# Patient Record
Sex: FEMALE | Race: WHITE | NOT HISPANIC OR LATINO | Employment: OTHER | URBAN - METROPOLITAN AREA
[De-identification: names, ages, dates, MRNs, and addresses within clinical notes are randomized per-mention and may not be internally consistent; named-entity substitution may affect disease eponyms.]

---

## 2018-06-05 ENCOUNTER — OFFICE VISIT CONVERTED (OUTPATIENT)
Dept: OTOLARYNGOLOGY | Facility: CLINIC | Age: 43
End: 2018-06-05
Attending: OTOLARYNGOLOGY

## 2020-11-03 ENCOUNTER — HOSPITAL ENCOUNTER (OUTPATIENT)
Dept: URGENT CARE | Facility: CLINIC | Age: 45
Discharge: HOME | End: 2020-11-03
Attending: PHYSICIAN ASSISTANT

## 2020-11-05 LAB
BACTERIA SPEC AEROBE CULT: NORMAL
SARS-COV-2 RNA SPEC QL NAA+PROBE: NOT DETECTED

## 2021-03-24 ENCOUNTER — HOSPITAL ENCOUNTER (OUTPATIENT)
Dept: OTHER | Facility: HOSPITAL | Age: 46
Discharge: HOME | End: 2021-03-24
Attending: NEUROLOGICAL SURGERY

## 2021-03-24 LAB
ALBUMIN SERPL-MCNC: 3.9 G/DL (ref 3.5–5)
ALBUMIN/GLOB SERPL: 1.3 {RATIO} (ref 1.4–2.6)
ALP SERPL-CCNC: 79 U/L (ref 42–98)
ALT SERPL-CCNC: 10 U/L (ref 10–40)
ANION GAP SERPL CALC-SCNC: 13 MMOL/L (ref 8–19)
APPEARANCE UR: ABNORMAL
AST SERPL-CCNC: 17 U/L (ref 15–50)
BASOPHILS # BLD AUTO: 0.07 10*3/UL (ref 0–0.2)
BASOPHILS NFR BLD AUTO: 1.1 % (ref 0–3)
BILIRUB SERPL-MCNC: <0.15 MG/DL (ref 0.2–1.3)
BILIRUB UR QL: NEGATIVE
BUN SERPL-MCNC: 12 MG/DL (ref 5–25)
BUN/CREAT SERPL: 12 {RATIO} (ref 6–20)
CALCIUM SERPL-MCNC: 9.3 MG/DL (ref 8.7–10.4)
CHLORIDE SERPL-SCNC: 109 MMOL/L (ref 99–111)
COLOR UR: YELLOW
CONV ABS IMM GRAN: 0.02 10*3/UL (ref 0–0.2)
CONV BACTERIA: ABNORMAL
CONV CO2: 20 MMOL/L (ref 22–32)
CONV COLLECTION SOURCE (UA): ABNORMAL
CONV HYALINE CASTS IN URINE MICRO: ABNORMAL /[LPF]
CONV IMMATURE GRAN: 0.3 % (ref 0–1.8)
CONV TOTAL PROTEIN: 6.8 G/DL (ref 6.3–8.2)
CONV UROBILINOGEN IN URINE BY AUTOMATED TEST STRIP: 1 {EHRLICHU}/DL (ref 0.1–1)
CREAT UR-MCNC: 0.98 MG/DL (ref 0.5–0.9)
DEPRECATED RDW RBC AUTO: 45.3 FL (ref 36.4–46.3)
EOSINOPHIL # BLD AUTO: 0.16 10*3/UL (ref 0–0.7)
EOSINOPHIL # BLD AUTO: 2.5 % (ref 0–7)
ERYTHROCYTE [DISTWIDTH] IN BLOOD BY AUTOMATED COUNT: 13.5 % (ref 11.7–14.4)
GFR SERPLBLD BASED ON 1.73 SQ M-ARVRAT: >60 ML/MIN/{1.73_M2}
GLOBULIN UR ELPH-MCNC: 2.9 G/DL (ref 2–3.5)
GLUCOSE SERPL-MCNC: 104 MG/DL (ref 65–99)
GLUCOSE UR QL: NEGATIVE MG/DL
HCT VFR BLD AUTO: 45.2 % (ref 37–47)
HGB BLD-MCNC: 15 G/DL (ref 12–16)
HGB UR QL STRIP: NEGATIVE
KETONES UR QL STRIP: NEGATIVE MG/DL
LEUKOCYTE ESTERASE UR QL STRIP: ABNORMAL
LYMPHOCYTES # BLD AUTO: 2.5 10*3/UL (ref 1–5)
LYMPHOCYTES NFR BLD AUTO: 39.6 % (ref 20–45)
MCH RBC QN AUTO: 30 PG (ref 27–31)
MCHC RBC AUTO-ENTMCNC: 33.2 G/DL (ref 33–37)
MCV RBC AUTO: 90.4 FL (ref 81–99)
MONOCYTES # BLD AUTO: 0.42 10*3/UL (ref 0.2–1.2)
MONOCYTES NFR BLD AUTO: 6.6 % (ref 3–10)
NEUTROPHILS # BLD AUTO: 3.15 10*3/UL (ref 2–8)
NEUTROPHILS NFR BLD AUTO: 49.9 % (ref 30–85)
NITRITE UR QL STRIP: POSITIVE
NRBC CBCN: 0 % (ref 0–0.7)
OSMOLALITY SERPL CALC.SUM OF ELEC: 286 MOSM/KG (ref 273–304)
PH UR STRIP.AUTO: 6.5 [PH] (ref 5–8)
PLATELET # BLD AUTO: 238 10*3/UL (ref 130–400)
PMV BLD AUTO: 10 FL (ref 9.4–12.3)
POTASSIUM SERPL-SCNC: 4.3 MMOL/L (ref 3.5–5.3)
PROT UR QL: NEGATIVE MG/DL
RBC # BLD AUTO: 5 10*6/UL (ref 4.2–5.4)
RBC #/AREA URNS HPF: ABNORMAL /[HPF]
SODIUM SERPL-SCNC: 138 MMOL/L (ref 135–147)
SP GR UR: 1.02 (ref 1–1.03)
SQUAMOUS SPT QL MICRO: ABNORMAL /[HPF]
WBC # BLD AUTO: 6.32 10*3/UL (ref 4.8–10.8)
WBC #/AREA URNS HPF: ABNORMAL /[HPF]

## 2021-04-01 ENCOUNTER — HOSPITAL ENCOUNTER (OUTPATIENT)
Dept: PREADMISSION TESTING | Facility: HOSPITAL | Age: 46
Discharge: HOME | End: 2021-04-01
Attending: NEUROLOGICAL SURGERY

## 2021-04-01 LAB — SARS-COV-2 RNA SPEC QL NAA+PROBE: NOT DETECTED

## 2021-05-16 VITALS
TEMPERATURE: 99.9 F | RESPIRATION RATE: 15 BRPM | BODY MASS INDEX: 19.65 KG/M2 | HEIGHT: 60 IN | DIASTOLIC BLOOD PRESSURE: 76 MMHG | OXYGEN SATURATION: 99 % | WEIGHT: 100.12 LBS | HEART RATE: 92 BPM | SYSTOLIC BLOOD PRESSURE: 126 MMHG

## 2021-06-10 ENCOUNTER — TRANSCRIBE ORDERS (OUTPATIENT)
Dept: ADMINISTRATIVE | Facility: HOSPITAL | Age: 46
End: 2021-06-10

## 2021-06-10 DIAGNOSIS — G40.309 GENERALIZED EPILEPSY (HCC): Primary | ICD-10-CM

## 2021-06-24 ENCOUNTER — APPOINTMENT (OUTPATIENT)
Dept: NEUROLOGY | Facility: HOSPITAL | Age: 46
End: 2021-06-24

## 2021-07-21 PROBLEM — D49.7 PINEAL TUMOR: Status: ACTIVE | Noted: 2021-07-21

## 2021-07-21 PROBLEM — J32.9 CHRONIC SINUSITIS: Status: ACTIVE | Noted: 2021-07-21

## 2021-07-21 PROBLEM — F41.9 ANXIETY: Status: ACTIVE | Noted: 2021-07-21

## 2021-07-21 PROBLEM — K64.9 HEMORRHOIDS: Status: ACTIVE | Noted: 2021-07-21

## 2021-07-21 PROBLEM — I49.3 PVC (PREMATURE VENTRICULAR CONTRACTION): Status: ACTIVE | Noted: 2021-07-21

## 2021-07-21 PROBLEM — F32.A DEPRESSION: Status: ACTIVE | Noted: 2021-07-21

## 2022-01-10 ENCOUNTER — OFFICE VISIT (OUTPATIENT)
Dept: OBSTETRICS AND GYNECOLOGY | Facility: CLINIC | Age: 47
End: 2022-01-10

## 2022-01-10 VITALS
SYSTOLIC BLOOD PRESSURE: 135 MMHG | HEART RATE: 76 BPM | DIASTOLIC BLOOD PRESSURE: 87 MMHG | BODY MASS INDEX: 20.03 KG/M2 | WEIGHT: 102 LBS | HEIGHT: 60 IN

## 2022-01-10 DIAGNOSIS — N76.4 LEFT GENITAL LABIAL ABSCESS: Primary | ICD-10-CM

## 2022-01-10 DIAGNOSIS — N89.8 VAGINAL DISCHARGE: ICD-10-CM

## 2022-01-10 PROBLEM — J45.909 ASTHMA: Status: ACTIVE | Noted: 2022-01-10

## 2022-01-10 PROCEDURE — 87660 TRICHOMONAS VAGIN DIR PROBE: CPT | Performed by: OBSTETRICS & GYNECOLOGY

## 2022-01-10 PROCEDURE — 87510 GARDNER VAG DNA DIR PROBE: CPT | Performed by: OBSTETRICS & GYNECOLOGY

## 2022-01-10 PROCEDURE — 87480 CANDIDA DNA DIR PROBE: CPT | Performed by: OBSTETRICS & GYNECOLOGY

## 2022-01-10 PROCEDURE — 99213 OFFICE O/P EST LOW 20 MIN: CPT | Performed by: OBSTETRICS & GYNECOLOGY

## 2022-01-10 RX ORDER — KETOROLAC TROMETHAMINE 10 MG/1
TABLET, FILM COATED ORAL EVERY 6 HOURS
COMMUNITY

## 2022-01-10 RX ORDER — METHOCARBAMOL 750 MG/1
TABLET, FILM COATED ORAL AS NEEDED
COMMUNITY
Start: 2021-11-03

## 2022-01-10 RX ORDER — LEVOFLOXACIN 500 MG/1
500 TABLET, FILM COATED ORAL DAILY
Qty: 10 TABLET | Refills: 0 | Status: SHIPPED | OUTPATIENT
Start: 2022-01-10 | End: 2022-01-17

## 2022-01-10 RX ORDER — NAPROXEN 250 MG/1
250 TABLET ORAL 2 TIMES DAILY PRN
COMMUNITY

## 2022-01-10 RX ORDER — LEVETIRACETAM 500 MG/1
TABLET ORAL
COMMUNITY
Start: 2021-11-23

## 2022-01-10 RX ORDER — METRONIDAZOLE 500 MG/1
500 TABLET ORAL 2 TIMES DAILY
Qty: 21 TABLET | Refills: 0 | Status: SHIPPED | OUTPATIENT
Start: 2022-01-10 | End: 2022-01-17

## 2022-01-10 RX ORDER — CLINDAMYCIN HYDROCHLORIDE 300 MG/1
200 CAPSULE ORAL 4 TIMES DAILY
COMMUNITY
End: 2022-01-10 | Stop reason: ALTCHOICE

## 2022-01-10 NOTE — ASSESSMENT & PLAN NOTE
No Bartholin cystic cyst.  The patient confirmed that the area that was drained was the left labia minora.  This sounds consistent with a left labial abscess.  The patient did have culture results from the abscess drainage on her phone which showed E. coli resistant to cefazolin but sensitive to Levaquin.  Clindamycin was not tested.  Discontinue clindamycin  Levaquin 500 mg 1 p.o. daily for a week.  Flagyl 500 mg p.o. twice daily for a week.  Sits baths daily.  Return in 1 week for follow-up.

## 2022-01-10 NOTE — PROGRESS NOTES
"GYN Visit    CC: Follow-up Bartholin cyst    HPI:   46 y.o. the patient was in [place] attending a family emergency last week.  She developed severe pain in the vaginal area and noted a bulge consistent with some sort of cystic structure of the genital region.  She had severe pain so she was seen in the emergency department there.  This was drained and cultured.  She was told this was a Bartholin cyst.  She was placed on clindamycin.  The pain is still present although the bulge seems to be better now.  She denies any fever or chills.  She denies any continued drainage from the incision site.    History: PMHx, Meds, Allergies, PSHx, Social Hx, and POBHx all reviewed and updated.    /87   Pulse 76   Ht 152.4 cm (60\")   Wt 46.3 kg (102 lb)   Breastfeeding No   BMI 19.92 kg/m²     Physical Exam  Vitals and nursing note reviewed. Exam conducted with a chaperone present.   Constitutional:       General: She is not in acute distress.     Appearance: Normal appearance. She is normal weight. She is not ill-appearing.   Neck:      Thyroid: No thyroid mass or thyromegaly.   Cardiovascular:      Rate and Rhythm: Regular rhythm.      Heart sounds: No murmur heard.      Pulmonary:      Effort: Pulmonary effort is normal.      Breath sounds: No wheezing.   Abdominal:      General: There is no distension.      Palpations: Abdomen is soft. There is no mass.      Tenderness: There is no abdominal tenderness.      Hernia: There is no hernia in the left inguinal area or right inguinal area.   Genitourinary:     General: Normal vulva.      Exam position: Lithotomy position.      Pubic Area: No rash.       Labia:         Right: No rash, tenderness, lesion or injury.         Left: Tenderness and lesion present.       Urethra: No prolapse or urethral pain.      Vagina: Vaginal discharge present.      Cervix: Normal.      Uterus: Normal.       Adnexa: Right adnexa normal and left adnexa normal.      Comments: There is " erythema and swelling of the upper portion of the left labia minora near the clitoral simms.  There is no fluctuant or cystic mass.  There is no drainage.  There is copious thick vaginal discharge within the vaginal vault.  There is no evidence of any cystic structures in either Bartholin's area.  Skin:     General: Skin is warm and dry.   Neurological:      Mental Status: She is alert and oriented to person, place, and time.   Psychiatric:         Mood and Affect: Mood normal.         Behavior: Behavior normal.         Thought Content: Thought content normal.           ASSESSMENT AND PLAN:  Diagnoses and all orders for this visit:    1. Left genital labial abscess (Primary)  Assessment & Plan:  No Bartholin cystic cyst.  The patient confirmed that the area that was drained was the left labia minora.  This sounds consistent with a left labial abscess.  The patient did have culture results from the abscess drainage on her phone which showed E. coli resistant to cefazolin but sensitive to Levaquin.  Clindamycin was not tested.  Discontinue clindamycin  Levaquin 500 mg 1 p.o. daily for a week.  Flagyl 500 mg p.o. twice daily for a week.  Sits baths daily.  Return in 1 week for follow-up.    Orders:  -     levoFLOXacin (Levaquin) 500 MG tablet; Take 1 tablet by mouth Daily for 7 days.  Dispense: 10 tablet; Refill: 0  -     metroNIDAZOLE (Flagyl) 500 MG tablet; Take 1 tablet by mouth 2 (Two) Times a Day for 7 days.  Dispense: 21 tablet; Refill: 0    2. Vaginal discharge  -     Gardnerella vaginalis, Trichomonas vaginalis, Candida albicans, DNA - Swab, Cervix      Follow Up:  Return in about 1 week (around 1/17/2022) for Recheck.    Aroldo Adams MD  01/10/2022

## 2022-01-11 ENCOUNTER — TELEPHONE (OUTPATIENT)
Dept: OBSTETRICS AND GYNECOLOGY | Facility: CLINIC | Age: 47
End: 2022-01-11

## 2022-01-11 DIAGNOSIS — B37.31 YEAST VAGINITIS: Primary | ICD-10-CM

## 2022-01-11 LAB
CANDIDA SPECIES: POSITIVE
GARDNERELLA VAGINALIS: POSITIVE
T VAGINALIS DNA VAG QL PROBE+SIG AMP: NEGATIVE

## 2022-01-11 RX ORDER — FLUCONAZOLE 200 MG/1
200 TABLET ORAL
Qty: 3 TABLET | Refills: 0 | Status: SHIPPED | OUTPATIENT
Start: 2022-01-11

## 2022-01-11 NOTE — TELEPHONE ENCOUNTER
----- Message from Aroldo Adams MD sent at 1/11/2022  7:40 AM EST -----  Notify the patient of + yeast and BV. The flagyl given at the OV will treat the BV, and an RX for diflucan was sent to the pharmacy for the yeast.

## 2022-01-11 NOTE — TELEPHONE ENCOUNTER
Discussed results with pt. Adv to finish RX given @ OV. Let her know RX for diflucan has been sent to her pharmacy.

## 2022-01-18 ENCOUNTER — OFFICE VISIT (OUTPATIENT)
Dept: OBSTETRICS AND GYNECOLOGY | Facility: CLINIC | Age: 47
End: 2022-01-18

## 2022-01-18 VITALS
HEART RATE: 87 BPM | DIASTOLIC BLOOD PRESSURE: 80 MMHG | WEIGHT: 99 LBS | SYSTOLIC BLOOD PRESSURE: 117 MMHG | HEIGHT: 60 IN | BODY MASS INDEX: 19.44 KG/M2

## 2022-01-18 DIAGNOSIS — N76.4 LEFT GENITAL LABIAL ABSCESS: Primary | ICD-10-CM

## 2022-01-18 DIAGNOSIS — Z72.0 TOBACCO USE: ICD-10-CM

## 2022-01-18 PROBLEM — N89.8 VAGINAL DISCHARGE: Status: RESOLVED | Noted: 2022-01-10 | Resolved: 2022-01-18

## 2022-01-18 PROCEDURE — 99213 OFFICE O/P EST LOW 20 MIN: CPT | Performed by: OBSTETRICS & GYNECOLOGY

## 2022-01-18 RX ORDER — BUSPIRONE HYDROCHLORIDE 15 MG/1
TABLET ORAL
COMMUNITY
Start: 2022-01-16

## 2022-01-18 NOTE — PROGRESS NOTES
"GYN Visit    CC: Follow up abscess    HPI:   46 y.o. who presents in follow up of a labial abscess.  She reports that she has today and tomorrow left on her antibiotic course.  She reports her symptoms are much improved and essentially resolved.  She has no new complaints today.    History: PMHx, Meds, Allergies, PSHx, Social Hx, and POBHx all reviewed and updated.    /80   Pulse 87   Ht 152.4 cm (60\")   Wt 44.9 kg (99 lb)   BMI 19.33 kg/m²     Physical Exam  Vitals and nursing note reviewed. Exam conducted with a chaperone present.   Constitutional:       General: She is not in acute distress.     Appearance: Normal appearance. She is normal weight. She is not ill-appearing.   Abdominal:      General: Abdomen is flat. Bowel sounds are normal. There is no distension.      Palpations: Abdomen is soft. There is no mass.      Tenderness: There is no abdominal tenderness. There is no guarding or rebound.      Hernia: No hernia is present.   Genitourinary:     General: Normal vulva.      Labia:         Right: No rash, tenderness, lesion or injury.         Left: No rash, tenderness, lesion or injury.       Comments: The left labia minora appears normal.  There is no erythema, there is no drainage, there is no induration.  The incision site from the incision and drainage is well-healed.  Neurological:      Mental Status: She is alert and oriented to person, place, and time.   Psychiatric:         Mood and Affect: Mood normal.         Behavior: Behavior normal.         Thought Content: Thought content normal.           ASSESSMENT AND PLAN:  Diagnoses and all orders for this visit:    1. Left genital labial abscess (Primary)  Assessment & Plan:  Complete Levaquin and Flagyl.  The patient reports that her last dose should be tomorrow.  The abscess and surrounding cellulitis has resolved.  May resume normal activities.  Recommended to avoid shaving in this area.  Discussed returns warnings and signs      2. " Tobacco use  Assessment & Plan:  Smoking cessation counseling          Follow Up:  Return if symptoms worsen or fail to improve.      Aroldo Adams MD  01/18/2022

## 2022-01-18 NOTE — ASSESSMENT & PLAN NOTE
Complete Levaquin and Flagyl.  The patient reports that her last dose should be tomorrow.  The abscess and surrounding cellulitis has resolved.  May resume normal activities.  Recommended to avoid shaving in this area.  Discussed returns warnings and signs

## 2023-06-03 ENCOUNTER — HOSPITAL ENCOUNTER (EMERGENCY)
Facility: HOSPITAL | Age: 48
Discharge: LEFT BEFORE DISCHARGE | End: 2023-06-04
Payer: MEDICARE

## 2023-06-03 VITALS
RESPIRATION RATE: 18 BRPM | HEART RATE: 80 BPM | WEIGHT: 105.6 LBS | DIASTOLIC BLOOD PRESSURE: 88 MMHG | OXYGEN SATURATION: 100 % | SYSTOLIC BLOOD PRESSURE: 158 MMHG | BODY MASS INDEX: 20.73 KG/M2 | HEIGHT: 60 IN

## 2023-06-03 LAB
BASOPHILS # BLD AUTO: 0.09 10*3/MM3 (ref 0–0.2)
BASOPHILS NFR BLD AUTO: 0.9 % (ref 0–1.5)
DEPRECATED RDW RBC AUTO: 47.3 FL (ref 37–54)
EOSINOPHIL # BLD AUTO: 0.15 10*3/MM3 (ref 0–0.4)
EOSINOPHIL NFR BLD AUTO: 1.6 % (ref 0.3–6.2)
ERYTHROCYTE [DISTWIDTH] IN BLOOD BY AUTOMATED COUNT: 14 % (ref 12.3–15.4)
HCT VFR BLD AUTO: 43.2 % (ref 34–46.6)
HGB BLD-MCNC: 14.6 G/DL (ref 12–15.9)
IMM GRANULOCYTES # BLD AUTO: 0.03 10*3/MM3 (ref 0–0.05)
IMM GRANULOCYTES NFR BLD AUTO: 0.3 % (ref 0–0.5)
LYMPHOCYTES # BLD AUTO: 3.68 10*3/MM3 (ref 0.7–3.1)
LYMPHOCYTES NFR BLD AUTO: 38.1 % (ref 19.6–45.3)
MCH RBC QN AUTO: 30.9 PG (ref 26.6–33)
MCHC RBC AUTO-ENTMCNC: 33.8 G/DL (ref 31.5–35.7)
MCV RBC AUTO: 91.3 FL (ref 79–97)
MONOCYTES # BLD AUTO: 0.67 10*3/MM3 (ref 0.1–0.9)
MONOCYTES NFR BLD AUTO: 6.9 % (ref 5–12)
NEUTROPHILS NFR BLD AUTO: 5.03 10*3/MM3 (ref 1.7–7)
NEUTROPHILS NFR BLD AUTO: 52.2 % (ref 42.7–76)
NRBC BLD AUTO-RTO: 0 /100 WBC (ref 0–0.2)
PLATELET # BLD AUTO: 243 10*3/MM3 (ref 140–450)
PMV BLD AUTO: 9.8 FL (ref 6–12)
RBC # BLD AUTO: 4.73 10*6/MM3 (ref 3.77–5.28)
WBC NRBC COR # BLD: 9.65 10*3/MM3 (ref 3.4–10.8)

## 2023-06-03 PROCEDURE — 85025 COMPLETE CBC W/AUTO DIFF WBC: CPT

## 2023-06-03 PROCEDURE — 99211 OFF/OP EST MAY X REQ PHY/QHP: CPT

## 2024-01-31 ENCOUNTER — OFFICE VISIT (OUTPATIENT)
Dept: OTOLARYNGOLOGY | Facility: CLINIC | Age: 49
End: 2024-01-31
Payer: MEDICARE

## 2024-01-31 ENCOUNTER — PROCEDURE VISIT (OUTPATIENT)
Dept: OTOLARYNGOLOGY | Facility: CLINIC | Age: 49
End: 2024-01-31
Payer: MEDICARE

## 2024-01-31 VITALS — SYSTOLIC BLOOD PRESSURE: 121 MMHG | HEART RATE: 75 BPM | DIASTOLIC BLOOD PRESSURE: 78 MMHG | TEMPERATURE: 97.4 F

## 2024-01-31 DIAGNOSIS — H93.12 TINNITUS OF LEFT EAR: Primary | ICD-10-CM

## 2024-01-31 DIAGNOSIS — H90.A32 MIXED CONDUCTIVE AND SENSORINEURAL HEARING LOSS OF LEFT EAR WITH RESTRICTED HEARING OF RIGHT EAR: ICD-10-CM

## 2024-01-31 DIAGNOSIS — H90.A31 MIXED CONDUCTIVE AND SENSORINEURAL HEARING LOSS OF RIGHT EAR WITH RESTRICTED HEARING OF LEFT EAR: ICD-10-CM

## 2024-01-31 DIAGNOSIS — H90.6 MIXED CONDUCTIVE AND SENSORINEURAL HEARING LOSS OF BOTH EARS: ICD-10-CM

## 2024-01-31 DIAGNOSIS — H74.91 DISORDER OF RIGHT MASTOID: ICD-10-CM

## 2024-01-31 DIAGNOSIS — J31.0 CHRONIC RHINITIS: ICD-10-CM

## 2024-01-31 PROCEDURE — 92567 TYMPANOMETRY: CPT | Performed by: AUDIOLOGIST

## 2024-01-31 PROCEDURE — 92557 COMPREHENSIVE HEARING TEST: CPT | Performed by: AUDIOLOGIST

## 2024-01-31 PROCEDURE — 99204 OFFICE O/P NEW MOD 45 MIN: CPT | Performed by: OTOLARYNGOLOGY

## 2024-01-31 RX ORDER — PROMETHAZINE HYDROCHLORIDE 25 MG/1
TABLET ORAL
COMMUNITY
Start: 2023-12-20

## 2024-01-31 RX ORDER — CLONAZEPAM 0.5 MG/1
TABLET ORAL
COMMUNITY
Start: 2023-12-20

## 2024-01-31 RX ORDER — CETIRIZINE HYDROCHLORIDE 10 MG/1
10 TABLET ORAL DAILY PRN
COMMUNITY
End: 2024-02-02 | Stop reason: SDUPTHER

## 2024-01-31 RX ORDER — CETIRIZINE HYDROCHLORIDE 10 MG/1
10 TABLET ORAL DAILY PRN
COMMUNITY
End: 2024-01-31

## 2024-01-31 RX ORDER — CYCLOBENZAPRINE HCL 5 MG
TABLET ORAL
COMMUNITY
Start: 2023-12-20

## 2024-01-31 RX ORDER — PSEUDOEPHEDRINE HCL 30 MG/1
TABLET, FILM COATED ORAL
COMMUNITY
Start: 2024-01-09

## 2024-01-31 NOTE — PROGRESS NOTES
Patient Name: Jovita Ha   Visit Date: 01/31/2024   Patient ID: [id number]  Provider: Eddy Rodriguez MD    Sex: female  Location: [facility]   YOB: 1975  Location Address: [address] [place] [address], [address],?[place]?[id number]    Primary Care Provider eJnny Mendosa MD  Location Phone: [phone]    Referring Provider: Jenny Mendosa MD        Chief Complaint  Tinnitus/Nosebleeds    History of Present Illness  Jovita Ha is a 49 y.o. female who presents to [facility] today as a consult from Jenny Mendosa MD for evaluation of her ears and sinuses.  She was originally seen on 6/5/2018 please see that note for further details.  At that time, she reported 68 episodes of cough, otalgia, and facial pressure annually.  MRI of her brain without contrast as well as a CT of her cervical spine without contrast on 3/15/18.  Both revealed right greater than left maxillary, ethmoid, sphenoid mucosal thickening. She demonstrated evidence of prior turbinate reductions, septoplasty, and bilateral maxillary antrostomies. Her frontal sinuses appeared healthy.  She tells me that since her last appointment she continues to experience intermittent ear infections as well as a sensation of left sided otorrhea.  She also reports clear watery rhinorrhea bilaterally.  She denies any salty or metallic taste in her mouth.  It does not seem to worsen with straining.  She denies any significant issues with nasal congestion or hyposmia.  She has experienced a recent episode of bilateral epistaxis.  She is not using any nasal sprays or rinses.  She has undergone previous allergy testing decades ago.  She also reports right greater than left hearing loss which has been present for years as well as left-sided tinnitus.  She does have a previous history of undergoing a right-sided mastoidectomy in 2001.  She ended up trying hearing aids sometime around 2006.  CT scan of her cervical spine without contrast at [facility] on 2023 demonstrated clear posterior maxillary sinuses, posterior ethmoids, sphenoid sinuses, middle ears, and mastoids.  The anterior maxillary sinuses, anterior ethmoids, and frontal sinuses were not imaged.  She is smoking 1/2 pack daily.        Past Medical History:   Diagnosis Date    Anxiety     Chronic sinusitis     Depression     Pineal tumor     PVC (premature ventricular contraction)        Past Surgical History:   Procedure Laterality Date    CERVICAL CHIARI DECOMPRESSION POSTERIOR       SECTION      COLONOSCOPY  2016    ENDOSCOPY  2016    KIDNEY STONE SURGERY      LUMBAR DECOMPRESSION      NECK SURGERY      SINUS SURGERY  2001    mastoids         Current Outpatient Medications:     ALPRAZolam (Xanax) 0.25 MG tablet, Xanax 0.25 mg oral tablet take 1 tablet by oral route As needed   Suspended, Disp: , Rfl:     atenolol (TENORMIN) 25 MG tablet, atenolol 25 mg oral tablet take 1 tablet (25 mg) by oral route 2 times per day   Active, Disp: , Rfl:     atorvastatin (Lipitor) 10 MG tablet, Lipitor 10 mg oral tablet take 1 tablet (10 mg) by oral route once daily   Active, Disp: , Rfl:     Azelastine HCl 137 MCG/SPRAY solution, , Disp: , Rfl:     busPIRone (BUSPAR) 15 MG tablet, , Disp: , Rfl:     clonazePAM (KlonoPIN) 0.5 MG tablet, , Disp: , Rfl:     cyclobenzaprine (FLEXERIL) 5 MG tablet, , Disp: , Rfl:     estradiol (ESTRACE) 0.5 MG tablet, estradiol 0.5 mg oral tablet take 1 tablet (0.5 mg) by oral route once daily   Active, Disp: , Rfl:     fluconazole (Diflucan) 200 MG tablet, Take 1 tablet by mouth Every 3 (Three) Days., Disp: 3 tablet, Rfl: 0    ketorolac (TORADOL) 10 MG tablet, Every 6 (Six) Hours., Disp: , Rfl:     levETIRAcetam (KEPPRA) 500 MG tablet, , Disp: , Rfl:     methocarbamol (ROBAXIN) 750 MG tablet, As Needed., Disp: , Rfl:     promethazine (PHENERGAN) 25 MG tablet, 1 tab(s) orally q 6 hr prn for 10 day(s), Disp:  , Rfl:     SudoGest 30 MG tablet, , Disp: , Rfl:     topiramate (Topamax) 200 MG tablet, Topamax 200 mg oral tablet take 1 tablet (200 mg) by oral route 2 times per day   Active, Disp: , Rfl:     cetirizine (zyrTEC) 10 MG tablet, Take 1 tablet by mouth Daily As Needed for Allergies., Disp: , Rfl:      No Known Allergies    Social History     Tobacco Use    Smoking status: Every Day     Packs/day: .5     Types: Cigarettes     Start date: 1991    Smokeless tobacco: Never   Vaping Use    Vaping Use: Never used   Substance Use Topics    Alcohol use: Yes     Comment: 2/3 drinks wk    Drug use: Never        Objective     Vital Signs:   /78   Pulse 75   Temp 97.4 °F (36.3 °C)       Physical Exam    General: Well developed, well nourished patient of stated age in no acute distress. Voice is strong and clear.   Head: Normocephalic and atraumatic.  Face: No lesions.  Bilateral parotid and submandibular glands are unremarkable.  Stensen's and Warthin's ducts are productive of clear saliva bilaterally.  House-Brackmann I/VI     bilaterally.   muscles and temporomandibular joint nontender to palpation.  No TMJ crepitus.  Eyes: PERRLA, sclerae anicteric, no conjunctival injection. Extraocular movements are intact and full. No nystagmus.   Ears: Auricles are normal in appearance. Bilateral external auditory canals are unremarkable. Bilateral tympanic membranes are clear and without effusion. Hearing normal to conversational voice.   Nose: External nose is normal in appearance. Bilateral nares are patent with normal appearing mucosa. Septum midline. Turbinates are unremarkable. No lesions.   Oral Cavity: Lips are normal in appearance. Oral mucosa is unremarkable. Gingiva is unremarkable. Normal dentition for age. Tongue is unremarkable with good movement. Hard palate is unremarkable.   Oropharynx: Soft palate is unremarkable with full movement. Uvula is unremarkable. Bilateral tonsils are unremarkable. Posterior  oropharynx is unremarkable.    Larynx and hypopharynx: Deferred secondary to gag reflex.  Neck: Supple.  No mass.  Nontender to palpation.  Trachea midline. Thyroid normal size and without nodules to palpation.   Lymphatic: No lymphadenopathy upon palpation.  Respiratory: Clear to auscultation bilaterally, nonlabored respirations    Cardiovascular: RRR, no murmurs, rubs, or gallops,   Psychiatric: Appropriate affect, cooperative   Neurologic: Oriented x 3, strength symmetric in all extremities, Cranial Nerves II-XII are grossly intact to confrontation   Skin: Warm and dry. No rashes.    Procedures           Result Review :               Assessment and Plan    Diagnoses and all orders for this visit:    1. Tinnitus of left ear (Primary)  -     Audiometry With Tympanometry; Future    2. Mixed conductive and sensorineural hearing loss of both ears    3. Chronic rhinitis      Impressions and findings were discussed at great length.  Examination today is unremarkable.  We reviewed and discussed the images from her CT scan of the cervical spine without contrast on 12/6/2023 which demonstrated clear posterior maxillary sinuses, posterior ethmoids, sphenoid sinuses, middle ears, and mastoids.  The anterior maxillary sinuses, anterior ethmoids, and frontal sinuses were not imaged.  Audiogram on 1/31/2024 demonstrated left mild rising to normal downsloping to moderate mixed loss and right moderate mixed loss which is predominantly conductive.  Her air-bone gap on the right is anywhere from 15 to 40 dB.  SRT is 30 on the right and 20 on the left.  Speech discrimination is 100% on the right at 65 dB and 100% on the left at 60 dB.  Tympanograms are type As bilaterally.  We discussed that there is no evidence of effusion or chronic rhinosinusitis on her recent CT scan but that her sinuses were incompletely imaged.  In regards to her hearing loss we discussed that her right-sided mixed loss which is predominantly conductive and is  likely related to an ossicular issue.  She does have a previous history of right-sided mastoidectomy that her surgery was in 2001 in [facility].  Options for further evaluation and management were discussed including referral for allergy workup as she does not tolerate nasal sprays or rinses versus an oral antihistamine versus further workup with a CT scan of the sinuses.  In regards to her hearing loss we again discussed the differential as well as options for management including binaural amplification versus exploratory right tympanotomy with possible ossicular chain reconstruction.  We reviewed the risks, benefits, alternatives, and expected postoperative course.  She would like to think about her options and will be started on cetirizine.  She was given ample time to ask questions, all of which were answered to her satisfaction.    Follow Up   No follow-ups on file.  Patient was given instructions and counseling regarding her condition or for health maintenance advice. Please see specific information pulled into the AVS if appropriate.

## 2024-01-31 NOTE — PROGRESS NOTES
AUDIOMETRIC EVALUATION      Name:  Jovita Ha  :  1975  Age:  49 y.o.  Date of Evaluation:  2024       History:  Mrs. Ha is seen today for a hearing evaluation due to history of mastoid surgery at the right ear.    Audiologic Information:  Concerns for Hearing: Yes  PETs: No  Other otologic surgical history: Yes mastoid surgery right ear  Aural Pressure/Fullness: Yes  Otalgia: None  Otorrhea: No  Tinnitus: Periodic  Dizziness: No  Noise Exposure: None  Family history of hearing loss: No  Head trauma requiring hospital stay: No  Chemotherapy: No  Other significant history: No    EVALUATION:    See audiogram    RESULTS:    Otoscopic Evaluation:        NOTE: Testing completed after ears were examined by Dr. Rodriguez.    Tympanometry (226 Hz):  Right: Type As  Left: Type As    IMPRESSIONS:  Pure tone thresholds for the right ear shows a moderate mixed hearing loss.  Pure tone thresholds for the left ear shows a mild and moderate mixed hearing loss.  Patient was counseled with regard to the findings.    RECOMMENDATIONS/PLAN:  Follow-up recommendations of Dr. Rodriguez.  Discussed results and recommendations with patient.         Gerard Bahena M.S, [facility]  Licensed Audiologist

## 2024-02-02 ENCOUNTER — TELEPHONE (OUTPATIENT)
Dept: OTOLARYNGOLOGY | Facility: CLINIC | Age: 49
End: 2024-02-02
Payer: MEDICARE

## 2024-02-02 DIAGNOSIS — J31.0 CHRONIC RHINITIS: ICD-10-CM

## 2024-02-02 RX ORDER — CETIRIZINE HYDROCHLORIDE 10 MG/1
10 TABLET ORAL DAILY PRN
Qty: 30 TABLET | Refills: 3 | Status: SHIPPED | OUTPATIENT
Start: 2024-02-02

## 2024-02-02 NOTE — TELEPHONE ENCOUNTER
Provider: DR MUNOZ    Caller: JANUARY BORIS    Relationship to Patient: SELF    Reason for Call: PT CALLED TO CONFIRM SHE WOULD LIKE TO PROCEED WITH SURGERY.    ALSO THE PHARMACY HAS NOT RECVD THE PRESCRIPTION FOR THE ZYTREC. PLEASE SEND THE PRESCRIPTION TO:    [facility]  [address]   # [phone]    PLEASE CALL PT TO CONFIRM PRESCRIPTION HAS BEEN SENT.

## 2024-02-05 ENCOUNTER — PREP FOR SURGERY (OUTPATIENT)
Dept: OTHER | Facility: HOSPITAL | Age: 49
End: 2024-02-05
Payer: MEDICARE

## 2024-02-05 DIAGNOSIS — H90.A31 MIXED CONDUCTIVE AND SENSORINEURAL HEARING LOSS OF RIGHT EAR WITH RESTRICTED HEARING OF LEFT EAR: Primary | ICD-10-CM

## 2024-02-05 RX ORDER — CEFAZOLIN SODIUM 2 G/100ML
2000 INJECTION, SOLUTION INTRAVENOUS ONCE
OUTPATIENT
Start: 2024-02-05 | End: 2024-02-05

## 2024-02-26 PROBLEM — H90.A31 MIXED CONDUCTIVE AND SENSORINEURAL HEARING LOSS OF RIGHT EAR WITH RESTRICTED HEARING OF LEFT EAR: Status: ACTIVE | Noted: 2024-02-05

## 2024-11-27 ENCOUNTER — HOSPITAL ENCOUNTER (EMERGENCY)
Facility: HOSPITAL | Age: 49
Discharge: HOME | End: 2024-11-27
Attending: EMERGENCY MEDICINE | Admitting: EMERGENCY MEDICINE
Payer: MEDICARE

## 2024-11-27 ENCOUNTER — APPOINTMENT (OUTPATIENT)
Dept: CT IMAGING | Facility: HOSPITAL | Age: 49
End: 2024-11-27
Payer: MEDICARE

## 2024-11-27 VITALS
WEIGHT: 117.5 LBS | RESPIRATION RATE: 20 BRPM | TEMPERATURE: 97.9 F | HEIGHT: 60 IN | BODY MASS INDEX: 23.07 KG/M2 | SYSTOLIC BLOOD PRESSURE: 132 MMHG | OXYGEN SATURATION: 97 % | DIASTOLIC BLOOD PRESSURE: 90 MMHG | HEART RATE: 76 BPM

## 2024-11-27 DIAGNOSIS — N13.30 HYDROURETERONEPHROSIS: Primary | ICD-10-CM

## 2024-11-27 DIAGNOSIS — N20.1 URETEROLITHIASIS: ICD-10-CM

## 2024-11-27 LAB
ALBUMIN SERPL-MCNC: 4.3 G/DL (ref 3.5–5.2)
ALBUMIN/GLOB SERPL: 1.3 G/DL
ALP SERPL-CCNC: 82 U/L (ref 39–117)
ALT SERPL W P-5'-P-CCNC: 11 U/L (ref 1–33)
ANION GAP SERPL CALCULATED.3IONS-SCNC: 16.3 MMOL/L (ref 5–15)
AST SERPL-CCNC: 18 U/L (ref 1–32)
BACTERIA UR QL AUTO: ABNORMAL /HPF
BASOPHILS # BLD AUTO: 0.02 10*3/MM3 (ref 0–0.2)
BASOPHILS NFR BLD AUTO: 0.2 % (ref 0–1.5)
BILIRUB SERPL-MCNC: 0.2 MG/DL (ref 0–1.2)
BILIRUB UR QL STRIP: NEGATIVE
BUN SERPL-MCNC: 16 MG/DL (ref 6–20)
BUN/CREAT SERPL: 14 (ref 7–25)
CALCIUM SPEC-SCNC: 9 MG/DL (ref 8.6–10.5)
CHLORIDE SERPL-SCNC: 107 MMOL/L (ref 98–107)
CLARITY UR: ABNORMAL
CO2 SERPL-SCNC: 17.7 MMOL/L (ref 22–29)
COLOR UR: YELLOW
CREAT SERPL-MCNC: 1.14 MG/DL (ref 0.57–1)
DEPRECATED RDW RBC AUTO: 48.3 FL (ref 37–54)
EGFRCR SERPLBLD CKD-EPI 2021: 59.5 ML/MIN/1.73
EOSINOPHIL # BLD AUTO: 0.02 10*3/MM3 (ref 0–0.4)
EOSINOPHIL NFR BLD AUTO: 0.2 % (ref 0.3–6.2)
ERYTHROCYTE [DISTWIDTH] IN BLOOD BY AUTOMATED COUNT: 14.4 % (ref 12.3–15.4)
GLOBULIN UR ELPH-MCNC: 3.2 GM/DL
GLUCOSE SERPL-MCNC: 128 MG/DL (ref 65–99)
GLUCOSE UR STRIP-MCNC: NEGATIVE MG/DL
HCT VFR BLD AUTO: 49.2 % (ref 34–46.6)
HGB BLD-MCNC: 16 G/DL (ref 12–15.9)
HGB UR QL STRIP.AUTO: ABNORMAL
HOLD SPECIMEN: NORMAL
HOLD SPECIMEN: NORMAL
HYALINE CASTS UR QL AUTO: ABNORMAL /LPF
IMM GRANULOCYTES # BLD AUTO: 0.04 10*3/MM3 (ref 0–0.05)
IMM GRANULOCYTES NFR BLD AUTO: 0.4 % (ref 0–0.5)
KETONES UR QL STRIP: NEGATIVE
LEUKOCYTE ESTERASE UR QL STRIP.AUTO: NEGATIVE
LIPASE SERPL-CCNC: 45 U/L (ref 13–60)
LYMPHOCYTES # BLD AUTO: 2.69 10*3/MM3 (ref 0.7–3.1)
LYMPHOCYTES NFR BLD AUTO: 29 % (ref 19.6–45.3)
MCH RBC QN AUTO: 29.6 PG (ref 26.6–33)
MCHC RBC AUTO-ENTMCNC: 32.5 G/DL (ref 31.5–35.7)
MCV RBC AUTO: 91.1 FL (ref 79–97)
MONOCYTES # BLD AUTO: 0.52 10*3/MM3 (ref 0.1–0.9)
MONOCYTES NFR BLD AUTO: 5.6 % (ref 5–12)
NEUTROPHILS NFR BLD AUTO: 5.98 10*3/MM3 (ref 1.7–7)
NEUTROPHILS NFR BLD AUTO: 64.6 % (ref 42.7–76)
NITRITE UR QL STRIP: NEGATIVE
NRBC BLD AUTO-RTO: 0 /100 WBC (ref 0–0.2)
PH UR STRIP.AUTO: 5.5 [PH] (ref 5–8)
PLATELET # BLD AUTO: 295 10*3/MM3 (ref 140–450)
PMV BLD AUTO: 9.8 FL (ref 6–12)
POTASSIUM SERPL-SCNC: 3.2 MMOL/L (ref 3.5–5.2)
PROT SERPL-MCNC: 7.5 G/DL (ref 6–8.5)
PROT UR QL STRIP: ABNORMAL
RBC # BLD AUTO: 5.4 10*6/MM3 (ref 3.77–5.28)
RBC # UR STRIP: ABNORMAL /HPF
REF LAB TEST METHOD: ABNORMAL
SODIUM SERPL-SCNC: 141 MMOL/L (ref 136–145)
SP GR UR STRIP: 1.02 (ref 1–1.03)
SQUAMOUS #/AREA URNS HPF: ABNORMAL /HPF
UROBILINOGEN UR QL STRIP: ABNORMAL
WBC # UR STRIP: ABNORMAL /HPF
WBC NRBC COR # BLD AUTO: 9.27 10*3/MM3 (ref 3.4–10.8)
WHOLE BLOOD HOLD COAG: NORMAL
WHOLE BLOOD HOLD SPECIMEN: NORMAL

## 2024-11-27 PROCEDURE — 85025 COMPLETE CBC W/AUTO DIFF WBC: CPT

## 2024-11-27 PROCEDURE — 36415 COLL VENOUS BLD VENIPUNCTURE: CPT

## 2024-11-27 PROCEDURE — 80053 COMPREHEN METABOLIC PANEL: CPT

## 2024-11-27 PROCEDURE — 99284 EMERGENCY DEPT VISIT MOD MDM: CPT

## 2024-11-27 PROCEDURE — 25810000003 SODIUM CHLORIDE 0.9 % SOLUTION

## 2024-11-27 PROCEDURE — 96375 TX/PRO/DX INJ NEW DRUG ADDON: CPT

## 2024-11-27 PROCEDURE — 25010000002 HYDROMORPHONE PER 4 MG: Performed by: EMERGENCY MEDICINE

## 2024-11-27 PROCEDURE — 96374 THER/PROPH/DIAG INJ IV PUSH: CPT

## 2024-11-27 PROCEDURE — 83690 ASSAY OF LIPASE: CPT

## 2024-11-27 PROCEDURE — 74176 CT ABD & PELVIS W/O CONTRAST: CPT

## 2024-11-27 PROCEDURE — 81001 URINALYSIS AUTO W/SCOPE: CPT

## 2024-11-27 PROCEDURE — 25010000002 ONDANSETRON PER 1 MG

## 2024-11-27 PROCEDURE — 25010000002 KETOROLAC TROMETHAMINE PER 15 MG

## 2024-11-27 RX ORDER — TAMSULOSIN HYDROCHLORIDE 0.4 MG/1
1 CAPSULE ORAL DAILY
Qty: 10 CAPSULE | Refills: 0 | Status: SHIPPED | OUTPATIENT
Start: 2024-11-27

## 2024-11-27 RX ORDER — HYDROMORPHONE HYDROCHLORIDE 1 MG/ML
1 INJECTION, SOLUTION INTRAMUSCULAR; INTRAVENOUS; SUBCUTANEOUS ONCE
Status: COMPLETED | OUTPATIENT
Start: 2024-11-27 | End: 2024-11-27

## 2024-11-27 RX ORDER — KETOROLAC TROMETHAMINE 30 MG/ML
30 INJECTION, SOLUTION INTRAMUSCULAR; INTRAVENOUS ONCE
Status: COMPLETED | OUTPATIENT
Start: 2024-11-27 | End: 2024-11-27

## 2024-11-27 RX ORDER — OXYCODONE AND ACETAMINOPHEN 5; 325 MG/1; MG/1
1 TABLET ORAL EVERY 6 HOURS PRN
Qty: 12 TABLET | Refills: 0 | Status: SHIPPED | OUTPATIENT
Start: 2024-11-27

## 2024-11-27 RX ORDER — ONDANSETRON 2 MG/ML
4 INJECTION INTRAMUSCULAR; INTRAVENOUS ONCE
Status: COMPLETED | OUTPATIENT
Start: 2024-11-27 | End: 2024-11-27

## 2024-11-27 RX ORDER — TAMSULOSIN HYDROCHLORIDE 0.4 MG/1
0.4 CAPSULE ORAL ONCE
Status: COMPLETED | OUTPATIENT
Start: 2024-11-27 | End: 2024-11-27

## 2024-11-27 RX ORDER — ONDANSETRON 4 MG/1
4 TABLET, ORALLY DISINTEGRATING ORAL EVERY 8 HOURS PRN
Qty: 20 TABLET | Refills: 0 | Status: SHIPPED | OUTPATIENT
Start: 2024-11-27

## 2024-11-27 RX ORDER — KETOROLAC TROMETHAMINE 10 MG/1
10 TABLET, FILM COATED ORAL EVERY 6 HOURS PRN
Qty: 15 TABLET | Refills: 0 | Status: SHIPPED | OUTPATIENT
Start: 2024-11-27

## 2024-11-27 RX ORDER — SODIUM CHLORIDE 0.9 % (FLUSH) 0.9 %
10 SYRINGE (ML) INJECTION AS NEEDED
Status: DISCONTINUED | OUTPATIENT
Start: 2024-11-27 | End: 2024-11-27 | Stop reason: HOSPADM

## 2024-11-27 RX ADMIN — HYDROMORPHONE HYDROCHLORIDE 1 MG: 1 INJECTION, SOLUTION INTRAMUSCULAR; INTRAVENOUS; SUBCUTANEOUS at 19:06

## 2024-11-27 RX ADMIN — ONDANSETRON 4 MG: 2 INJECTION INTRAMUSCULAR; INTRAVENOUS at 19:04

## 2024-11-27 RX ADMIN — KETOROLAC TROMETHAMINE 30 MG: 30 INJECTION, SOLUTION INTRAMUSCULAR; INTRAVENOUS at 19:05

## 2024-11-27 RX ADMIN — SODIUM CHLORIDE 1000 ML: 9 INJECTION, SOLUTION INTRAVENOUS at 19:09

## 2024-11-27 RX ADMIN — TAMSULOSIN HYDROCHLORIDE 0.4 MG: 0.4 CAPSULE ORAL at 18:59

## 2024-11-27 NOTE — ED PROVIDER NOTES
Time: 4:53 PM EST  Date of encounter:  2024  Independent Historian/Clinical History and Information was obtained by:   Patient    History is limited by: N/A    Chief Complaint   Patient presents with    Abdominal Pain    Nausea    Vomiting    Fever    Diarrhea         History of Present Illness:  Patient is a 49 y.o. year old female who presents to the emergency department for evaluation of left flank pain that began today.  Patient reports history of similar pain in the past with kidney stones.  Patient also reports nausea and vomiting.  Denies any dysuria or merrill hematuria.  Patient reports fever chills and bodyaches at home but that she is also currently being treated for double ear infection and bronchitis and she believes her fevers are due to this.    Patient also she states having left lower quadrant abdominal pain with diarrhea that started this morning.  Her fever PTA was 101 and took Tylenol earlier today.  She is currently on Augmentin and started this past Monday.    Patient Care Team  Primary Care Provider: Jenny Mendosa MD    Past Medical History:     No Known Allergies  Past Medical History:   Diagnosis Date    Anxiety     Chronic sinusitis     Depression     Pineal tumor     PVC (premature ventricular contraction)      Past Surgical History:   Procedure Laterality Date    CERVICAL CHIARI DECOMPRESSION POSTERIOR       SECTION      COLONOSCOPY  2016    ENDOSCOPY  2016    KIDNEY STONE SURGERY      LUMBAR DECOMPRESSION      NECK SURGERY      SINUS SURGERY  2001    mastoids     Family History   Problem Relation Age of Onset    Stroke Maternal Grandmother     Diabetes Maternal Grandmother        Home Medications:  Prior to Admission medications    Medication Sig Start Date End Date Taking? Authorizing Provider   ALPRAZolam (Xanax) 0.25 MG tablet Xanax 0.25 mg oral tablet take 1 tablet by oral route As needed   Suspended    Provider, MD Safia   atenolol (TENORMIN) 25 MG tablet  atenolol 25 mg oral tablet take 1 tablet (25 mg) by oral route 2 times per day   Active    Safia Kaufman MD   atorvastatin (Lipitor) 10 MG tablet Lipitor 10 mg oral tablet take 1 tablet (10 mg) by oral route once daily   Active    Safia Kaufman MD   Azelastine HCl 137 MCG/SPRAY solution  2/14/22   Safia Kaufman MD   busPIRone (BUSPAR) 15 MG tablet  1/16/22   Safia Kaufman MD   cetirizine (zyrTEC) 10 MG tablet Take 1 tablet by mouth Daily As Needed for Allergies. 2/2/24   Eddy Rodriguez MD   clonazePAM (KlonoPIN) 0.5 MG tablet  12/20/23   Safia Kaufman MD   cyclobenzaprine (FLEXERIL) 5 MG tablet  12/20/23   Safia Kaufman MD   estradiol (ESTRACE) 0.5 MG tablet estradiol 0.5 mg oral tablet take 1 tablet (0.5 mg) by oral route once daily   Active    Safia Kaufman MD   fluconazole (Diflucan) 200 MG tablet Take 1 tablet by mouth Every 3 (Three) Days. 1/11/22   Aroldo Adams MD   ketorolac (TORADOL) 10 MG tablet Every 6 (Six) Hours.    Safia Kaufman MD   levETIRAcetam (KEPPRA) 500 MG tablet  11/23/21   Safia Kaufman MD   methocarbamol (ROBAXIN) 750 MG tablet As Needed. 11/3/21   Safia Kaufman MD   promethazine (PHENERGAN) 25 MG tablet 1 tab(s) orally q 6 hr prn for 10 day(s) 12/20/23   Safia Kaufman MD   SudoGest 30 MG tablet  1/9/24   Safia Kaufman MD   topiramate (Topamax) 200 MG tablet Topamax 200 mg oral tablet take 1 tablet (200 mg) by oral route 2 times per day   Active    ProviderSafia MD        Social History:   Social History     Tobacco Use    Smoking status: Every Day     Current packs/day: 0.50     Average packs/day: 0.5 packs/day for 33.9 years (17.0 ttl pk-yrs)     Types: Cigarettes     Start date: 1991    Smokeless tobacco: Never   Vaping Use    Vaping status: Never Used   Substance Use Topics    Alcohol use: Yes     Comment: 2/3 drinks wk    Drug use: Never         Review of  "Systems:  Review of Systems   Constitutional: Negative.  Positive for fever.   HENT: Negative.     Eyes: Negative.    Respiratory: Negative.     Cardiovascular: Negative.    Gastrointestinal: Negative.  Positive for abdominal pain, diarrhea, nausea and vomiting.   Endocrine: Negative.    Genitourinary: Negative.  Positive for flank pain. Negative for dysuria and hematuria.   Musculoskeletal: Negative.    Skin: Negative.    Allergic/Immunologic: Negative.    Neurological: Negative.    Hematological: Negative.    Psychiatric/Behavioral: Negative.          Physical Exam:  /98 (BP Location: Right arm, Patient Position: Sitting)   Pulse 68   Temp 98.1 °F (36.7 °C) (Oral)   Resp 16   Ht 152.4 cm (60\")   Wt 53.3 kg (117 lb 8.1 oz)   SpO2 97%   BMI 22.95 kg/m²         Physical Exam  Vitals and nursing note reviewed.   Constitutional:       Appearance: Normal appearance.   HENT:      Head: Normocephalic and atraumatic.      Nose: Nose normal.      Mouth/Throat:      Mouth: Mucous membranes are moist.   Eyes:      Extraocular Movements: Extraocular movements intact.      Conjunctiva/sclera: Conjunctivae normal.      Pupils: Pupils are equal, round, and reactive to light.   Cardiovascular:      Rate and Rhythm: Normal rate and regular rhythm.      Heart sounds: Normal heart sounds.   Pulmonary:      Effort: Pulmonary effort is normal.      Breath sounds: Normal breath sounds.   Abdominal:      General: Abdomen is flat. Bowel sounds are normal. There is no distension.      Palpations: Abdomen is soft.      Tenderness: There is abdominal tenderness in the left lower quadrant. There is left CVA tenderness. There is no right CVA tenderness.   Musculoskeletal:         General: Normal range of motion.      Cervical back: Normal range of motion and neck supple.   Skin:     General: Skin is warm and dry.   Neurological:      General: No focal deficit present.      Mental Status: She is alert and oriented to person, place, " and time.   Psychiatric:         Mood and Affect: Mood normal.         Behavior: Behavior normal.                  Procedures:  Procedures      Medical Decision Making:      Comorbidities that affect care:    Kidney stones    External Notes reviewed:    Previous Clinic Note: Office visit with PCP 11/25/2024 where she was seen for acute sinusitis and sore throat with nasal discharge x 1 week.      The following orders were placed and all results were independently analyzed by me:  Orders Placed This Encounter   Procedures    CT Abdomen Pelvis Stone Protocol    [place] Draw    Comprehensive Metabolic Panel    Lipase    Urinalysis With Microscopic If Indicated (No Culture) - Urine, Clean Catch    CBC Auto Differential    Urinalysis, Microscopic Only - Urine, Clean Catch    NPO Diet NPO Type: Strict NPO    Undress & Gown    Inpatient Urology Consult    Insert Peripheral IV    CBC & Differential    Green Top (Gel)    Lavender Top    Gold Top - SST    Light Blue Top       Medications Given in the Emergency Department:  Medications   sodium chloride 0.9 % flush 10 mL (has no administration in time range)   ondansetron (ZOFRAN) injection 4 mg (4 mg Intravenous Given 11/27/24 1904)   ketorolac (TORADOL) injection 30 mg (30 mg Intravenous Given 11/27/24 1905)   sodium chloride 0.9 % bolus 1,000 mL (1,000 mL Intravenous New Bag 11/27/24 1909)   HYDROmorphone PF (DILAUDID) injection 1 mg (1 mg Intravenous Given 11/27/24 1906)   tamsulosin (FLOMAX) 24 hr capsule 0.4 mg (0.4 mg Oral Given 11/27/24 1859)        ED Course:    The patient was initially evaluated in the triage area where orders were placed. The patient was later dispositioned by Radha Andrade PA-C.      The patient was advised to stay for completion of workup which includes but is not limited to communication of labs and radiological results, reassessment and plan. The patient was advised that leaving prior to disposition by a provider could result in critical  findings that are not communicated to the patient.     ED Course as of 11/27/24 1951 Wed Nov 27, 2024 1655   --- PROVIDER IN TRIAGE NOTE ---    The patient was evaluated by Kacey delarosa in triage. Orders were placed and the patient is currently awaiting disposition.      [CE]   1945 Patient's pain is controlled.  She has no white count and is afebrile.  No concern for urosepsis. [AJ]      ED Course User Index  [AJ] Radha Andrade, CHIN  [CE] Kacey Santiago P, APRWILY       Labs:    Lab Results (last 24 hours)       Procedure Component Value Units Date/Time    CBC & Differential [[id number]]  (Abnormal) Collected: 11/27/24 1632    Specimen: Blood from Arm, Right Updated: 11/27/24 1655    Narrative:      The following orders were created for panel order CBC & Differential.  Procedure                               Abnormality         Status                     ---------                               -----------         ------                     CBC Auto Differential[[id number]]        Abnormal            Final result                 Please view results for these tests on the individual orders.    Comprehensive Metabolic Panel [[id number]]  (Abnormal) Collected: 11/27/24 1632    Specimen: Blood from Arm, Right Updated: 11/27/24 1718     Glucose 128 mg/dL      BUN 16 mg/dL      Creatinine 1.14 mg/dL      Sodium 141 mmol/L      Potassium 3.2 mmol/L      Chloride 107 mmol/L      CO2 17.7 mmol/L      Calcium 9.0 mg/dL      Total Protein 7.5 g/dL      Albumin 4.3 g/dL      ALT (SGPT) 11 U/L      AST (SGOT) 18 U/L      Alkaline Phosphatase 82 U/L      Total Bilirubin 0.2 mg/dL      Globulin 3.2 gm/dL      A/G Ratio 1.3 g/dL      BUN/Creatinine Ratio 14.0     Anion Gap 16.3 mmol/L      eGFR 59.5 mL/min/1.73     Narrative:      GFR Normal >60  Chronic Kidney Disease <60  Kidney Failure <15      Lipase [[id number]]  (Normal) Collected: 11/27/24 1632    Specimen: Blood from Arm, Right Updated: 11/27/24 1718     Lipase 45  U/L     CBC Auto Differential [[id number]]  (Abnormal) Collected: 11/27/24 1632    Specimen: Blood from Arm, Right Updated: 11/27/24 1655     WBC 9.27 10*3/mm3      RBC 5.40 10*6/mm3      Hemoglobin 16.0 g/dL      Hematocrit 49.2 %      MCV 91.1 fL      MCH 29.6 pg      MCHC 32.5 g/dL      RDW 14.4 %      RDW-SD 48.3 fl      MPV 9.8 fL      Platelets 295 10*3/mm3      Neutrophil % 64.6 %      Lymphocyte % 29.0 %      Monocyte % 5.6 %      Eosinophil % 0.2 %      Basophil % 0.2 %      Immature Grans % 0.4 %      Neutrophils, Absolute 5.98 10*3/mm3      Lymphocytes, Absolute 2.69 10*3/mm3      Monocytes, Absolute 0.52 10*3/mm3      Eosinophils, Absolute 0.02 10*3/mm3      Basophils, Absolute 0.02 10*3/mm3      Immature Grans, Absolute 0.04 10*3/mm3      nRBC 0.0 /100 WBC     Urinalysis With Microscopic If Indicated (No Culture) - Urine, Clean Catch [[id number]]  (Abnormal) Collected: 11/27/24 1639    Specimen: Urine, Clean Catch Updated: 11/27/24 1659     Color, UA Yellow     Appearance, UA Cloudy     pH, UA 5.5     Specific Gravity, UA 1.016     Glucose, UA Negative     Ketones, UA Negative     Bilirubin, UA Negative     Blood, UA Large (3+)     Protein, UA Trace     Leuk Esterase, UA Negative     Nitrite, UA Negative     Urobilinogen, UA 0.2 E.U./dL    Urinalysis, Microscopic Only - Urine, Clean Catch [[id number]]  (Abnormal) Collected: 11/27/24 1639    Specimen: Urine, Clean Catch Updated: 11/27/24 1659     RBC, UA Too Numerous to Count /HPF      WBC, UA 3-5 /HPF      Bacteria, UA None Seen /HPF      Squamous Epithelial Cells, UA 7-12 /HPF      Hyaline Casts, UA 3-6 /LPF      Methodology Automated Microscopy             Imaging:    CT Abdomen Pelvis Stone Protocol    Result Date: 11/27/2024  CT ABDOMEN PELVIS STONE PROTOCOL Date of Exam: 11/27/2024 5:33 PM EST Indication: left flank pain. Comparison: None available. Technique: Axial CT images were obtained of the abdomen and pelvis without the administration of  contrast. Reconstructed coronal and sagittal images were also obtained. Automated exposure control and iterative construction methods were used. FINDINGS: Lung bases: No masses. No consolidation. Liver:No masses. No intrahepatic biliary ductal dilatation. Spleen:No masses. No perisplenic hematoma. Pancreas:No pancreatic masses. No evidence of pancreatitis. Gallbladder and common bile duct:No evidence of cholelithiasis. No evidence of cholecystitis. Adrenal glands:No adrenal masses Kidneys and ureters: Severe left-sided hydroureteronephrosis. Nonobstructing bilateral renal calculi measuring up to 0.5 cm on the left and 0.4 cm on the right. Left-sided hydroureter extends through a 0.6 cm distal left ureteric calculus on image #156 series 2 as well as a 0.9 cm calculus at the left ureterovesicular junction. Urinary bladder:No urinary bladder wall thickening. No bladder masses. Small bowel:Normal caliber small bowel. Large bowel:No diverticulosis or diverticulitis. No large bowel masses are appreciated Appendix: Normal GENITOURINARY: Prior hysterectomy Ascites or pneumoperitoneum:None. Adenopathy:None present Osseous structures: The proximal femurs are intact. The pubic bones are intact. The sacrum and sacroiliac joints are normal. Prior lumbar spine fusion. Previous Other findings: None     Severe left-sided hydroureteronephrosis with 2 obstructing left renal calculi measuring 0.6 cm and 0.9 cm respectively Electronically Signed: Eleuterio Yousif MD  11/27/2024 6:00 PM EST  Workstation ID: [id number]       Differential Diagnosis and Discussion:      Abdominal Pain: Based on the patient's signs and symptoms, I considered abdominal aortic aneurysm, small bowel obstruction, pancreatitis, acute cholecystitis, acute appendecitis, peptic ulcer disease, gastritis, colitis, endocrine disorders, irritable bowel syndrome and other differential diagnosis an etiology of the patient's abdominal pain.  Flank Pain: Differential diagnosis  includes but is not limited to kidney stones, pyelonephritis, musculoskeletal disorders, renal infarction, urinary tract infection, hydronephrosis, radiculopathy, aortic aneurysm, renal cell carcinoma.    All labs were reviewed and interpreted by me.  CT scan radiology impression was interpreted by me.    MDM                   Patient Care Considerations:    SEPSIS was considered but is NOT present in the emergency department as SIRS criteria is not present. ANTIBIOTICS: I considered prescribing antibiotics as an outpatient however no bacterial focus of infection was found.      Consultants/Shared Management Plan:    Consultant: I have discussed the case with Dr. Thomas, urologist who states patient can follow-up in office    Social Determinants of Health:    Patient is independent, reliable, and has access to care.       Disposition and Care Coordination:    Discharged: I considered escalation of care by admitting this patient to the hospital, however after consulting with urology, patient to follow-up in office    I have explained the patient´s condition, diagnoses and treatment plan based on the information available to me at this time. I have answered questions and addressed any concerns. The patient has a good  understanding of the patient´s diagnosis, condition, and treatment plan as can be expected at this point. The vital signs have been stable. The patient´s condition is stable and appropriate for discharge from the emergency department.      The patient will pursue further outpatient evaluation with the primary care physician or other designated or consulting physician as outlined in the discharge instructions. They are agreeable to this plan of care and follow-up instructions have been explained in detail. The patient has received these instructions in written format and has expressed an understanding of the discharge instructions. The patient is aware that any significant change in condition or worsening  of symptoms should prompt an immediate return to this or the closest emergency department or call to 911.  I have explained discharge medications and the need for follow up with the patient/caretakers. This was also printed in the discharge instructions. Patient was discharged with the following medications and follow up:      Medication List        New Prescriptions      ondansetron ODT 4 MG disintegrating tablet  Commonly known as: ZOFRAN-ODT  Place 1 tablet on the tongue Every 8 (Eight) Hours As Needed for Nausea or Vomiting.     tamsulosin 0.4 MG capsule 24 hr capsule  Commonly known as: FLOMAX  Take 1 capsule by mouth Daily.            Changed      * ketorolac 10 MG tablet  Commonly known as: TORADOL  What changed: Another medication with the same name was added. Make sure you understand how and when to take each.     * ketorolac 10 MG tablet  Commonly known as: TORADOL  Take 1 tablet by mouth Every 6 (Six) Hours As Needed for Moderate Pain.  What changed: You were already taking a medication with the same name, and this prescription was added. Make sure you understand how and when to take each.           * This list has 2 medication(s) that are the same as other medications prescribed for you. Read the directions carefully, and ask your doctor or other care provider to review them with you.                   Where to Get Your Medications        These medications were sent to [facility]/pharmacy #[id number] - [place], [place] - [address] - [phone] [facility] [phone]   [address] [place] [address]      Hours: 24-hours Phone: [phone]   ketorolac 10 MG tablet  ondansetron ODT 4 MG disintegrating tablet  tamsulosin 0.4 MG capsule 24 hr capsule      No follow-up provider specified.     Final diagnoses:   Hydroureteronephrosis   Ureterolithiasis        ED Disposition       ED Disposition   Discharge    Condition   Stable    Comment   --               This medical record created using voice  recognition software.             Radha Andrade PA-C  11/27/24 1951

## 2024-11-28 NOTE — DISCHARGE INSTRUCTIONS
Your urine was negative for UTI  Your lab work shows no concern for infection  Your CT scan shows that you have 2 kidney stones in the left distal ureter  I have sent pain medicine, nausea medicine along with Flomax.  Please take Flomax daily until he passed a kidney stone.  Please use urine strainer every time you urinate until they pass      I have consulted with urology who states she can follow-up.  I have put in referral and their office will call you to schedule a follow-up appointment    If any fevers, uncontrolled pain or vomiting please return to the ED

## 2024-12-13 ENCOUNTER — HOSPITAL ENCOUNTER (OUTPATIENT)
Dept: GENERAL RADIOLOGY | Facility: HOSPITAL | Age: 49
Discharge: HOME | End: 2024-12-13
Payer: MEDICARE

## 2024-12-13 ENCOUNTER — TRANSCRIBE ORDERS (OUTPATIENT)
Dept: GENERAL RADIOLOGY | Facility: HOSPITAL | Age: 49
End: 2024-12-13
Payer: MEDICARE

## 2024-12-13 DIAGNOSIS — N20.0 NEPHROLITHIASIS: Primary | ICD-10-CM

## 2024-12-13 DIAGNOSIS — N20.0 NEPHROLITHIASIS: ICD-10-CM

## 2024-12-13 PROCEDURE — 74018 RADEX ABDOMEN 1 VIEW: CPT

## 2025-01-20 ENCOUNTER — TELEPHONE (OUTPATIENT)
Dept: UROLOGY | Age: 50
End: 2025-01-20
Payer: MEDICARE

## 2025-01-20 NOTE — TELEPHONE ENCOUNTER
Sp w/ pt to triage and get on schedule; pt states she did pass both of the stones that were on previous CT and does not currently have stones or any signs or symptoms; RN states will notify scheduling to get her on the next available to go over everything w/ Dr. Morrison; pt is agreeable and understanding; RN advised since she has a history if feels like is getting another stone or needs to get in sooner to notify us; pt states understanding and is agreeable; no further questions at this time.

## 2025-02-24 NOTE — PROGRESS NOTES
Chief Complaint: new patient and recurrent nephrolithiasis (Stones spaced several years apart and pt. Says she has passed the ones she currently had in November)    Subjective         History of Present Illness  Jovita Ha is a 50 y.o. female presents to [facility] to be seen for recurrent nephrolithiasis.    The patient was seen at [facility] ED on 11/27/2024 with complaints of left flank pain.  CT scan showed 2 stones in the left ureter with severe left-sided hydroureteronephrosis.  The patient reports that she was able to pass both of those kidney stones shortly after her visit to the ED.  Follow-up abdominal x-ray completed on 12/13/2024 did not show the previously noted distal ureteral stones.  The patient denies any bothersome urinary symptoms.  She denies any gross hematuria.  She reports that she does have a longstanding history of recurrent kidney stones going back to 2003.  She reports that she has had 4 or 5 surgeries for removal of stones, with her last procedure being done in 2007.  We did discuss that the patient's CT scan that was done at the ER did show nonobstructing bilateral renal calculi measuring up to 5 mm on the left and 4 mm on the right.  The patient's PCP did discuss with her that her use of Topamax could be contributing to her recurrent kidney stones.  The patient reports that this medication is very important to her because it does help significantly with her headaches and does not want to come off of this medication if she does not have to.    Frequency-denies     Urgency-denies     Incontinence-denies     Nocturia-denies     GH-denies     History of stones-admits     [id number] stone interventions from [id number]     Family history of  malignancy-denies     Cardiopulmonary-PVCs/PACs, asthma Chiari malformation, chronic migraines     Anticoagulants-denies     Smoker-admits, less than 1/2 ppd     CT ABDOMEN PELVIS STONE PROTOCOL  Date of  Exam: 11/27/2024 5:33 PM EST     Indication: left flank pain.     Comparison: None available.     FINDINGS:     Lung bases: No masses. No consolidation.     Liver:No masses. No intrahepatic biliary ductal dilatation.     Spleen:No masses. No perisplenic hematoma.     Pancreas:No pancreatic masses. No evidence of pancreatitis.     Gallbladder and common bile duct:No evidence of cholelithiasis. No evidence of cholecystitis.     Adrenal glands:No adrenal masses     Kidneys and ureters: Severe left-sided hydroureteronephrosis. Nonobstructing bilateral renal calculi measuring up to 0.5 cm on the left and 0.4 cm on the right. Left-sided hydroureter extends through a 0.6 cm distal left ureteric calculus on image #[id number]   series 2 as well as a 0.9 cm calculus at the left ureterovesicular junction.      Urinary bladder:No urinary bladder wall thickening. No bladder masses.     Small bowel:Normal caliber small bowel.     Large bowel:No diverticulosis or diverticulitis. No large bowel masses are appreciated     Appendix: Normal     GENITOURINARY: Prior hysterectomy     Ascites or pneumoperitoneum:None.     Adenopathy:None present     Osseous structures: The proximal femurs are intact. The pubic bones are intact. The sacrum and sacroiliac joints are normal. Prior lumbar spine fusion. Previous     Other findings: None     IMPRESSION:  Severe left-sided hydroureteronephrosis with 2 obstructing left renal calculi measuring 0.6 cm and 0.9 cm respectively     Electronically Signed: Eleuterio Yousif MD    11/27/2024 6:00 PM EST     XR ABDOMEN 1 VW     Date of Exam: 12/13/2024 1:26 PM EST     Indication: Nephrolithiasis     Comparison: CT abdomen pelvis November 27, 2024     Findings:  There is a scattered amount of gas and residue within bowel in a nonspecific fashion. Patient has had previous spinal surgery at L5-S1. Previously noted distal ureteral stones not identified on this exam. The small intrarenal calculi noted on CT not well    appreciated on this exam.     IMPRESSION:  Impression:  1.Nonspecific bowel gas pattern.  2.Previously noted distal ureteral stones not identified on this exam.           Electronically Signed: Damian Manuel MD    2024 1:32 PM EST     Objective     Past Medical History:   Diagnosis Date    Anxiety     Chronic sinusitis     Depression     Pineal tumor     PVC (premature ventricular contraction)        Past Surgical History:   Procedure Laterality Date    CERVICAL CHIARI DECOMPRESSION POSTERIOR       SECTION      COLONOSCOPY  2016    ENDOSCOPY  2016    KIDNEY STONE SURGERY      LUMBAR DECOMPRESSION      NECK SURGERY      SINUS SURGERY  2001    mastoids         Current Outpatient Medications:     albuterol (PROVENTIL) (2.5 MG/3ML) 0.083% nebulizer solution, Every 8 (Eight) Hours., Disp: , Rfl:     atenolol (TENORMIN) 25 MG tablet, atenolol 25 mg oral tablet take 1 tablet (25 mg) by oral route 2 times per day   Active, Disp: , Rfl:     atorvastatin (Lipitor) 10 MG tablet, Lipitor 10 mg oral tablet take 1 tablet (10 mg) by oral route once daily   Active, Disp: , Rfl:     busPIRone (BUSPAR) 15 MG tablet, , Disp: , Rfl:     estradiol (ESTRACE) 0.5 MG tablet, estradiol 0.5 mg oral tablet take 1 tablet (0.5 mg) by oral route once daily   Active, Disp: , Rfl:     ketorolac (TORADOL) 10 MG tablet, Every 6 (Six) Hours., Disp: , Rfl:     ketorolac (TORADOL) 10 MG tablet, Take 1 tablet by mouth Every 6 (Six) Hours As Needed for Moderate Pain., Disp: 15 tablet, Rfl: 0    methocarbamol (ROBAXIN) 750 MG tablet, As Needed., Disp: , Rfl:     promethazine (PHENERGAN) 25 MG tablet, 1 tab(s) orally q 6 hr prn for 10 day(s), Disp: , Rfl:     topiramate (Topamax) 200 MG tablet, Topamax 200 mg oral tablet take 1 tablet (200 mg) by oral route 2 times per day   Active, Disp: , Rfl:     Ventolin  (90 Base) MCG/ACT inhaler, Every 8 (Eight) Hours., Disp: , Rfl:     ALPRAZolam (Xanax) 0.25 MG tablet, Xanax 0.25 mg oral tablet  "take 1 tablet by oral route As needed   Suspended (Patient not taking: Reported on 2/27/2025), Disp: , Rfl:     Azelastine HCl 137 MCG/SPRAY solution, , Disp: , Rfl:     cetirizine (zyrTEC) 10 MG tablet, Take 1 tablet by mouth Daily As Needed for Allergies. (Patient not taking: Reported on 2/27/2025), Disp: 30 tablet, Rfl: 3    clonazePAM (KlonoPIN) 0.5 MG tablet, , Disp: , Rfl:     cyclobenzaprine (FLEXERIL) 5 MG tablet, , Disp: , Rfl:     levETIRAcetam (KEPPRA) 500 MG tablet, , Disp: , Rfl:     ondansetron ODT (ZOFRAN-ODT) 4 MG disintegrating tablet, Place 1 tablet on the tongue Every 8 (Eight) Hours As Needed for Nausea or Vomiting. (Patient not taking: Reported on 2/27/2025), Disp: 20 tablet, Rfl: 0    oxyCODONE-acetaminophen (PERCOCET) 5-325 MG per tablet, Take 1 tablet by mouth Every 6 (Six) Hours As Needed for Severe Pain. (Patient not taking: Reported on 2/27/2025), Disp: 12 tablet, Rfl: 0    SudoGest 30 MG tablet, , Disp: , Rfl:     tamsulosin (FLOMAX) 0.4 MG capsule 24 hr capsule, Take 1 capsule by mouth Daily. (Patient not taking: Reported on 2/27/2025), Disp: 10 capsule, Rfl: 0    No Known Allergies     Family History   Problem Relation Age of Onset    Stroke Maternal Grandmother     Diabetes Maternal Grandmother        Social History     Socioeconomic History    Marital status:     Number of children: 3   Tobacco Use    Smoking status: Every Day     Current packs/day: 0.50     Average packs/day: 0.5 packs/day for 34.2 years (17.1 ttl pk-yrs)     Types: Cigarettes     Start date: 1991    Smokeless tobacco: Never   Vaping Use    Vaping status: Never Used   Substance and Sexual Activity    Alcohol use: Yes     Comment: 2/3 drinks wk    Drug use: Never    Sexual activity: Not Currently       Vital Signs:   Resp 12   Ht 152.4 cm (60\")   Wt 53.1 kg (117 lb)   BMI 22.85 kg/m²      Physical Exam  Vitals and nursing note reviewed.   Constitutional:       General: She is not in acute distress.     " Appearance: Normal appearance. She is not toxic-appearing.   Pulmonary:      Effort: Pulmonary effort is normal. No respiratory distress.   Neurological:      General: No focal deficit present.      Mental Status: She is alert and oriented to person, place, and time.          Result Review :   The following data was reviewed by: TERRENCE Patel on 02/27/2025:  Results for orders placed or performed in visit on 02/27/25   Bladder Scan    Collection Time: 02/27/25 10:06 AM   Result Value Ref Range    Urine Volume 0      Bladder Scan interpretation 02/27/2025    Estimation of residual urine via BVI 3000 Verathon Bladder Scan  MA/nurse performing: Justyna SIMS MA  Residual Urine: 0 ml  Indication: Recurrent nephrolithiasis    Ureterolithiasis, history of    Hydroureteronephrosis, history of   Position: Supine  Examination: Incremental scanning of the suprapubic area using 2.0 MHz transducer using copious amounts of acoustic gel.   Findings: An anechoic area was demonstrated which represented the bladder, with measurement of residual urine as noted. I inspected this myself. In that the residual urine was stable or insignificant, refer to plan for treatment and plan necessary at this time.              Procedures        Assessment and Plan    Diagnoses and all orders for this visit:    1. Recurrent nephrolithiasis (Primary)  -     Bladder Scan  -     XR Abdomen KUB; Future    2. Ureterolithiasis, history of  -     US Renal Bilateral; Future    3. Hydroureteronephrosis, history of    Will obtain renal ultrasound to ensure that the hydronephrosis has resolved.  Discussed prevention of kidney stones, handouts provided.      Will plan to see the patient back in the office in 1 year with KUB prior or sooner if needed for any concerns, passage of kidney stones, etc.      Follow Up   Return in about 1 year (around 2/27/2026) for with KUB prior .  Patient was given instructions and counseling regarding her condition or for  health maintenance advice. Please see specific information pulled into the AVS if appropriate.         This document has been electronically signed by TERRENCE Patel  February 27, 2025 12:36 EST

## 2025-02-27 ENCOUNTER — OFFICE VISIT (OUTPATIENT)
Dept: UROLOGY | Age: 50
End: 2025-02-27
Payer: MEDICARE

## 2025-02-27 VITALS — BODY MASS INDEX: 22.97 KG/M2 | RESPIRATION RATE: 12 BRPM | HEIGHT: 60 IN | WEIGHT: 117 LBS

## 2025-02-27 DIAGNOSIS — N20.0 RECURRENT NEPHROLITHIASIS: Primary | ICD-10-CM

## 2025-02-27 DIAGNOSIS — N13.30 HYDROURETERONEPHROSIS: ICD-10-CM

## 2025-02-27 DIAGNOSIS — N20.1 URETEROLITHIASIS: ICD-10-CM

## 2025-02-27 LAB — URINE VOLUME: 0

## 2025-02-27 RX ORDER — ALBUTEROL SULFATE 90 UG/1
AEROSOL, METERED RESPIRATORY (INHALATION) EVERY 8 HOURS SCHEDULED
COMMUNITY

## 2025-02-27 RX ORDER — ALBUTEROL SULFATE 0.83 MG/ML
SOLUTION RESPIRATORY (INHALATION) EVERY 8 HOURS SCHEDULED
COMMUNITY

## 2025-02-28 ENCOUNTER — OFFICE VISIT (OUTPATIENT)
Dept: CARDIOLOGY | Facility: CLINIC | Age: 50
End: 2025-02-28
Payer: MEDICARE

## 2025-02-28 VITALS
SYSTOLIC BLOOD PRESSURE: 122 MMHG | WEIGHT: 118 LBS | DIASTOLIC BLOOD PRESSURE: 84 MMHG | HEIGHT: 60 IN | BODY MASS INDEX: 23.16 KG/M2 | HEART RATE: 65 BPM

## 2025-02-28 DIAGNOSIS — I25.10 ATHEROSCLEROSIS OF NATIVE CORONARY ARTERY OF NATIVE HEART WITHOUT ANGINA PECTORIS: ICD-10-CM

## 2025-02-28 DIAGNOSIS — I10 HYPERTENSION, ESSENTIAL: ICD-10-CM

## 2025-02-28 DIAGNOSIS — I49.3 PVC (PREMATURE VENTRICULAR CONTRACTION): ICD-10-CM

## 2025-02-28 DIAGNOSIS — R07.9 CHEST PAIN, UNSPECIFIED TYPE: Primary | ICD-10-CM

## 2025-02-28 DIAGNOSIS — E78.2 HYPERLIPEMIA, MIXED: ICD-10-CM

## 2025-02-28 PROCEDURE — 1159F MED LIST DOCD IN RCRD: CPT | Performed by: SPECIALIST

## 2025-02-28 PROCEDURE — 93000 ELECTROCARDIOGRAM COMPLETE: CPT | Performed by: SPECIALIST

## 2025-02-28 PROCEDURE — 99204 OFFICE O/P NEW MOD 45 MIN: CPT | Performed by: SPECIALIST

## 2025-02-28 PROCEDURE — 1160F RVW MEDS BY RX/DR IN RCRD: CPT | Performed by: SPECIALIST

## 2025-02-28 NOTE — PROGRESS NOTES
[facility]   Cardiology Consult Note    Patient Name: Jovita Ha  : 1975  Referring Physician: Jenny Mendosa MD  Subjective   Subjective     Reason for Consult/ Chief Complaint:   Chest pain/palpitation  HPI:  Jovita Ha is a 50 y.o. female with history of chest pain on and off for several months.  Chest pain is substernal lasted a few seconds relieved spontaneously no exertional angina.  She also has some occasional palpitations for several years.  Non-smoker.    Review of Systems:    Constitutional no fever,  no weight loss   Skin no rash   Otolaryngeal no difficulty swallowing   Cardiovascular See HPI   Pulmonary no cough, no sputum production   Gastrointestinal no constipation, no diarrhea   Genitourinary no dysuria, no hematuria   Hematologic no easy bruisability, no abnormal bleeding   Musculoskeletal no muscle pain   Neurologic no dizziness, no falls       Personal History     Past Medical History:  Past Medical History:   Diagnosis Date    Anxiety     Chronic sinusitis     Depression     Pineal tumor     PVC (premature ventricular contraction)        Family History:   Family History   Problem Relation Age of Onset    Stroke Maternal Grandmother     Diabetes Maternal Grandmother        Social History:  reports that she has been smoking cigarettes. She started smoking about 34 years ago. She has a 17.1 pack-year smoking history. She has never used smokeless tobacco. She reports current alcohol use. She reports that she does not use drugs.    Home Medications:  albuterol, albuterol sulfate HFA, atenolol, atorvastatin, busPIRone, estradiol, ketorolac, methocarbamol, promethazine, and topiramate    Allergies:  No Known Allergies    Objective    Objective     Vitals:   Heart Rate:  [65] 65  BP: (122)/(84) 122/84  Body mass index is 23.05 kg/m².  PHYSICAL EXAM:    General Appearance:   well developed  well nourished  HENT:   oropharynx moist  lips not cyanotic  Neck:  thyroid not  enlarged  supple  Respiratory:  no respiratory distress  normal breath sounds  no rales  Cardiovascular:  no jugular venous distention  regular rhythm  apical impulse normal  S1 normal, S2 normal  no S3, no S4   no murmur  no rub, no thrill  carotid pulses normal; no bruit  pedal pulses normal  lower extremity edema: none    Skin:   warm, dry  Psychiatric:  judgement and insight appropriate  normal mood and affect    RESULTS:    EKG reviewed by me and shows sinus rhythm       Result Review    Result Review:  I have personally reviewed the available results:  [x]  Laboratory  [x]  EKG/Telemetry   [x]  Cardiology/Vascular   [x] Medications  [x]  Old records      ECG 12 Lead    Date/Time: 2/28/2025 12:26 PM  Performed by: Rogelio Spencer MD    Authorized by: Rogelio Spencer MD  Comparison: compared with previous ECG   Similar to previous ECG  Rhythm: sinus rhythm  Rate: normal  QRS axis: normal  Comments: Normal sinus rhythm.  No significant changes compared to previous EKG           Impression/Plan  1. Precordial atypical chest pain: Sestamibi stress test to evaluate for any ischemia in view of her risk factors.  2.  Palpitations: Continue Tenormin 25 mg once a day.  24-hour Holter monitoring.  Echocardiogram.  3.  Mixed hyperlipidemia: Continue Lipitor 10 mg once a day.  Monitor lipid and hepatic profile.  4.  Positive nicotine use: Smoking cessation discussed with patient      Electronically signed by Rogelio Spencer MD, 02/28/25, 11:41 AM EST.

## 2025-03-05 ENCOUNTER — HOSPITAL ENCOUNTER (OUTPATIENT)
Facility: HOSPITAL | Age: 50
Discharge: HOME | End: 2025-03-05
Admitting: SPECIALIST
Payer: MEDICARE

## 2025-03-05 DIAGNOSIS — I49.3 PVC (PREMATURE VENTRICULAR CONTRACTION): ICD-10-CM

## 2025-03-05 PROCEDURE — 93225 XTRNL ECG REC<48 HRS REC: CPT

## 2025-03-06 ENCOUNTER — HOSPITAL ENCOUNTER (OUTPATIENT)
Dept: ULTRASOUND IMAGING | Facility: HOSPITAL | Age: 50
Discharge: HOME | End: 2025-03-06
Admitting: NURSE PRACTITIONER
Payer: MEDICARE

## 2025-03-06 DIAGNOSIS — N20.1 URETEROLITHIASIS: ICD-10-CM

## 2025-03-06 PROCEDURE — 76775 US EXAM ABDO BACK WALL LIM: CPT

## 2025-03-07 ENCOUNTER — TELEPHONE (OUTPATIENT)
Dept: UROLOGY | Age: 50
End: 2025-03-07
Payer: MEDICARE

## 2025-03-07 NOTE — PROGRESS NOTES
Please let the patient know that her ultrasound does show improvement of the hydronephrosis that she had previously which is what we would want to see after passage of a stone.  We will plan to see the patient back in the office as scheduled or sooner if needed.  Thank you.

## 2025-03-07 NOTE — TELEPHONE ENCOUNTER
----- Message from Hayley Garcia sent at 3/7/2025  7:41 AM EST -----  Please let the patient know that her ultrasound does show improvement of the hydronephrosis that she had previously which is what we would want to see after passage of a stone.  We will plan to see the patient back in the office as scheduled or soone  r if needed.  Thank you.

## 2025-03-07 NOTE — TELEPHONE ENCOUNTER
Patient was called and advised that ultrasound shows improvement.  Patient voiced understanding and will keep follow up as scheduled.

## 2025-03-10 LAB
CV ZIO BASELINE AVG BPM: 62
CV ZIO BASELINE BPM HIGH: 110
CV ZIO BASELINE BPM LOW: 44

## 2025-03-11 ENCOUNTER — HOSPITAL ENCOUNTER (OUTPATIENT)
Facility: HOSPITAL | Age: 50
Discharge: HOME | End: 2025-03-11
Admitting: SPECIALIST
Payer: MEDICARE

## 2025-03-11 DIAGNOSIS — I49.3 PVC (PREMATURE VENTRICULAR CONTRACTION): ICD-10-CM

## 2025-03-11 LAB
ASCENDING AORTA: 2.6 CM
AV MEAN PRESS GRAD SYS DOP V1V2: 3 MMHG
AV VMAX SYS DOP: 125 CM/SEC
BH CV ECHO MEAS - AO MAX PG: 6.3 MMHG
BH CV ECHO MEAS - AO ROOT DIAM: 2.3 CM
BH CV ECHO MEAS - AO V2 VTI: 25.4 CM
BH CV ECHO MEAS - AVA(I,D): 1.73 CM2
BH CV ECHO MEAS - IVS/LVPW: 1.6 CM
BH CV ECHO MEAS - IVSD: 0.8 CM
BH CV ECHO MEAS - LA DIMENSION: 2 CM
BH CV ECHO MEAS - LAT PEAK E' VEL: 10.8 CM/SEC
BH CV ECHO MEAS - LV MAX PG: 6.8 MMHG
BH CV ECHO MEAS - LV MEAN PG: 3 MMHG
BH CV ECHO MEAS - LV V1 MAX: 130 CM/SEC
BH CV ECHO MEAS - LV V1 VTI: 21.9 CM
BH CV ECHO MEAS - LVIDD: 3.6 CM
BH CV ECHO MEAS - LVIDS: 2.3 CM
BH CV ECHO MEAS - LVOT AREA: 2.01 CM2
BH CV ECHO MEAS - LVOT DIAM: 1.6 CM
BH CV ECHO MEAS - LVPWD: 0.5 CM
BH CV ECHO MEAS - MED PEAK E' VEL: 11.8 CM/SEC
BH CV ECHO MEAS - MV A MAX VEL: 24.1 CM/SEC
BH CV ECHO MEAS - MV E MAX VEL: 70.7 CM/SEC
BH CV ECHO MEAS - MV E/A: 2.9
BH CV ECHO MEAS - MV MAX PG: 2.8 MMHG
BH CV ECHO MEAS - MV MEAN PG: 0.9 MMHG
BH CV ECHO MEAS - MV V2 VTI: 21.9 CM
BH CV ECHO MEAS - MVA(VTI): 2.01 CM2
BH CV ECHO MEAS - RVDD: 2.3 CM
BH CV ECHO MEAS - SV(LVOT): 43.9 ML
BH CV ECHO MEAS - TAPSE (>1.6): 1.83 CM
BH CV ECHO MEASUREMENTS AVERAGE E/E' RATIO: 6.26
BH CV XLRA - TDI S': 10.3 CM/SEC
IVRT: 67 MS
LEFT ATRIUM VOLUME INDEX: 11.1 ML/M2
LV EF BIPLANE MOD: 60.6 %

## 2025-03-11 PROCEDURE — 93306 TTE W/DOPPLER COMPLETE: CPT
